# Patient Record
Sex: MALE | Race: WHITE | NOT HISPANIC OR LATINO
[De-identification: names, ages, dates, MRNs, and addresses within clinical notes are randomized per-mention and may not be internally consistent; named-entity substitution may affect disease eponyms.]

---

## 2018-12-26 PROBLEM — Z00.00 ENCOUNTER FOR PREVENTIVE HEALTH EXAMINATION: Status: ACTIVE | Noted: 2018-12-26

## 2019-01-01 ENCOUNTER — FORM ENCOUNTER (OUTPATIENT)
Age: 69
End: 2019-01-01

## 2019-01-02 ENCOUNTER — APPOINTMENT (OUTPATIENT)
Dept: ORTHOPEDIC SURGERY | Facility: CLINIC | Age: 69
End: 2019-01-02
Payer: MEDICARE

## 2019-01-02 ENCOUNTER — OUTPATIENT (OUTPATIENT)
Dept: OUTPATIENT SERVICES | Facility: HOSPITAL | Age: 69
LOS: 1 days | End: 2019-01-02
Payer: MEDICARE

## 2019-01-02 DIAGNOSIS — Z78.9 OTHER SPECIFIED HEALTH STATUS: ICD-10-CM

## 2019-01-02 PROCEDURE — 99203 OFFICE O/P NEW LOW 30 MIN: CPT

## 2019-01-02 PROCEDURE — 73562 X-RAY EXAM OF KNEE 3: CPT | Mod: 26,50

## 2019-01-02 PROCEDURE — 73562 X-RAY EXAM OF KNEE 3: CPT

## 2019-01-04 VITALS
HEIGHT: 68.5 IN | WEIGHT: 220 LBS | SYSTOLIC BLOOD PRESSURE: 118 MMHG | BODY MASS INDEX: 32.96 KG/M2 | HEART RATE: 76 BPM | DIASTOLIC BLOOD PRESSURE: 80 MMHG | OXYGEN SATURATION: 99 %

## 2019-01-04 PROBLEM — Z78.9 DENIES ALCOHOL CONSUMPTION: Status: ACTIVE | Noted: 2019-01-04

## 2019-01-04 PROBLEM — Z78.9 EXERCISES OCCASIONALLY: Status: ACTIVE | Noted: 2019-01-04

## 2019-01-04 PROBLEM — Z78.9 DOES NOT USE ILLICIT DRUGS: Status: ACTIVE | Noted: 2019-01-04

## 2019-01-04 RX ORDER — LISINOPRIL 30 MG/1
TABLET ORAL
Refills: 0 | Status: ACTIVE | COMMUNITY

## 2019-01-04 RX ORDER — OMEPRAZOLE 40 MG/1
40 CAPSULE, DELAYED RELEASE ORAL
Refills: 0 | Status: ACTIVE | COMMUNITY

## 2019-06-03 ENCOUNTER — APPOINTMENT (OUTPATIENT)
Dept: ORTHOPEDIC SURGERY | Facility: CLINIC | Age: 69
End: 2019-06-03
Payer: MEDICARE

## 2019-06-03 PROCEDURE — 99213 OFFICE O/P EST LOW 20 MIN: CPT

## 2019-06-03 RX ORDER — DICLOFENAC SODIUM 10 MG/G
1 GEL TOPICAL DAILY
Qty: 2 | Refills: 0 | Status: ACTIVE | COMMUNITY
Start: 2019-06-03 | End: 1900-01-01

## 2019-06-04 LAB
BASOPHILS # BLD AUTO: 0.11 K/UL
BASOPHILS NFR BLD AUTO: 1.2 %
CRP SERPL-MCNC: 0.26 MG/DL
EOSINOPHIL # BLD AUTO: 0.24 K/UL
EOSINOPHIL NFR BLD AUTO: 2.6 %
ERYTHROCYTE [SEDIMENTATION RATE] IN BLOOD BY WESTERGREN METHOD: 6 MM/HR
HCT VFR BLD CALC: 39.9 %
HGB BLD-MCNC: 13.5 G/DL
IMM GRANULOCYTES NFR BLD AUTO: 0.4 %
LYMPHOCYTES # BLD AUTO: 2.15 K/UL
LYMPHOCYTES NFR BLD AUTO: 23 %
MAN DIFF?: NORMAL
MCHC RBC-ENTMCNC: 31.7 PG
MCHC RBC-ENTMCNC: 33.8 GM/DL
MCV RBC AUTO: 93.7 FL
MONOCYTES # BLD AUTO: 0.64 K/UL
MONOCYTES NFR BLD AUTO: 6.9 %
NEUTROPHILS # BLD AUTO: 6.16 K/UL
NEUTROPHILS NFR BLD AUTO: 65.9 %
PLATELET # BLD AUTO: 177 K/UL
RBC # BLD: 4.26 M/UL
RBC # FLD: 13.7 %
WBC # FLD AUTO: 9.34 K/UL

## 2019-06-17 NOTE — HISTORY OF PRESENT ILLNESS
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 His pain is moderate, 4-6/10 with daily activities.

## 2019-06-17 NOTE — PHYSICAL EXAM
[de-identified] : General: NAOD, A0x3, Appropriately Dressed\par Skin: Warm and Dry, Normal Turgor, no rashes\par Neuro: AOx3, Cranial nerves grossly intact\par Psych: Mood and affect appropriate\par \par Left: No swelling edema erythema redness or drainage. Well-healed longitudinal midline incision. No significant tenderness to palpation. Range of motion -3 to 140. Moderate varus valgus laxity, opens more with varus stress. Positive anterior drawer. 5/5 strength. Does not use assistive devices for ambulation.\par \par Right: No swelling edema erythema redness or drainage. Well-healed longitudinal midline incision. No significant tenderness to palpation. Range of motion 0 to 135. mild varus valgus laxity, opens more with varus stress. Negative anterior drawer. 5/5 strength. Does not use assistive devices for ambulation.\par \par Bilateral pes planus, right wrist than left

## 2019-06-17 NOTE — ASSESSMENT
[FreeTextEntry1] : Assessment\par #1 painful bilateral total knee status post exploration\par \par Plan\par #1 begin diclofenac show, begin supervised physical therapy, followup in 6-8 weeks

## 2023-07-07 ENCOUNTER — OUTPATIENT (OUTPATIENT)
Dept: OUTPATIENT SERVICES | Facility: HOSPITAL | Age: 73
LOS: 1 days | End: 2023-07-07
Payer: MEDICARE

## 2023-07-07 ENCOUNTER — RESULT REVIEW (OUTPATIENT)
Age: 73
End: 2023-07-07

## 2023-07-07 ENCOUNTER — APPOINTMENT (OUTPATIENT)
Dept: ORTHOPEDIC SURGERY | Facility: CLINIC | Age: 73
End: 2023-07-07
Payer: MEDICARE

## 2023-07-07 VITALS
BODY MASS INDEX: 32.96 KG/M2 | WEIGHT: 220 LBS | DIASTOLIC BLOOD PRESSURE: 80 MMHG | HEART RATE: 58 BPM | HEIGHT: 68.5 IN | SYSTOLIC BLOOD PRESSURE: 152 MMHG | OXYGEN SATURATION: 97 %

## 2023-07-07 DIAGNOSIS — Z96.653 PAIN IN RIGHT KNEE: ICD-10-CM

## 2023-07-07 DIAGNOSIS — M25.562 PAIN IN RIGHT KNEE: ICD-10-CM

## 2023-07-07 DIAGNOSIS — Z86.79 PERSONAL HISTORY OF OTHER DISEASES OF THE CIRCULATORY SYSTEM: ICD-10-CM

## 2023-07-07 DIAGNOSIS — G89.28 PAIN IN RIGHT KNEE: ICD-10-CM

## 2023-07-07 DIAGNOSIS — Z96.653 PRESENCE OF ARTIFICIAL KNEE JOINT, BILATERAL: ICD-10-CM

## 2023-07-07 DIAGNOSIS — M25.561 PAIN IN RIGHT KNEE: ICD-10-CM

## 2023-07-07 PROCEDURE — 73564 X-RAY EXAM KNEE 4 OR MORE: CPT | Mod: 26,50

## 2023-07-07 PROCEDURE — 99204 OFFICE O/P NEW MOD 45 MIN: CPT

## 2023-07-07 PROCEDURE — 73564 X-RAY EXAM KNEE 4 OR MORE: CPT

## 2023-07-07 NOTE — DISCUSSION/SUMMARY
[de-identified] : S/P total knee replacement revisions, progressing well.  He is reassured by the x-ray findings and exam that there is no obvious complication related to the prostheses.  I do think his planovalgus deformities may be contributing somewhat to some patellofemoral impingement type symptoms and I recommended a podiatry evaluation.  We will provide contact information for that.  Symptomatic and pain relief, range of motion, activity advancement and precaution strategies reviewed, including use of over-the-counter medications as needed.  We provided information regarding the need for antibiotic prophylaxis for future dental or invasive procedures.  This was a second opinion visit he will follow-up as an of the questions or concerns but will follow-up with his primary surgeon.

## 2023-07-07 NOTE — PHYSICAL EXAM
[de-identified] : Physical exam he is able to walk with a smooth gait with no obvious antalgia or Trendelenburg.  Both knees have well-healed incision and range of motion is from 0 to 115 degrees.  There is no instability of the knee.  He is neurologically intact from a motor and sensory standpoint.  He has bilateral fixed planovalgus deformities. [de-identified] : 3 views of each each prosthesis are reviewed.  There is 2 well aligned knee arthroplasties with no obvious hardware complication loosening fracture or dislocation.  The revision implants with cones in place.

## 2023-07-07 NOTE — HISTORY OF PRESENT ILLNESS
[de-identified] : Patient is a pleasant 70-year-old gentleman comes in for evaluation of bilateral knees.  He reports he had 2 knee revisions last September and then December as he was having pain and he reports a recalled implants.  There is no specific complications related to the surgeries.  He feels much better than he did prior to the surgery.  He did physical therapy for about 6 months.  However he continues to have some lateral pain in both knees.  Is just adjacent to the patella.  He notices it more with walking and stairs.  He is not using any assistive devices.